# Patient Record
Sex: MALE | Race: WHITE
[De-identification: names, ages, dates, MRNs, and addresses within clinical notes are randomized per-mention and may not be internally consistent; named-entity substitution may affect disease eponyms.]

---

## 2020-10-21 VITALS — SYSTOLIC BLOOD PRESSURE: 155 MMHG | DIASTOLIC BLOOD PRESSURE: 72 MMHG

## 2020-11-11 ENCOUNTER — HOSPITAL ENCOUNTER (OUTPATIENT)
Dept: HOSPITAL 61 - SURGPAT | Age: 70
End: 2020-11-11
Attending: SURGERY
Payer: MEDICARE

## 2020-11-11 DIAGNOSIS — Z20.828: ICD-10-CM

## 2020-11-11 DIAGNOSIS — K80.20: ICD-10-CM

## 2020-11-11 DIAGNOSIS — Z01.818: Primary | ICD-10-CM

## 2020-11-11 LAB
ALBUMIN SERPL-MCNC: 3.6 G/DL (ref 3.4–5)
ALP SERPL-CCNC: 277 U/L (ref 46–116)
ALT SERPL-CCNC: 38 U/L (ref 16–63)
AST SERPL-CCNC: 28 U/L (ref 15–37)
BILIRUB DIRECT SERPL-MCNC: 0.3 MG/DL (ref 0–0.2)
BILIRUB SERPL-MCNC: 0.6 MG/DL (ref 0.2–1)
PROT SERPL-MCNC: 7.3 G/DL (ref 6.4–8.2)

## 2020-11-11 PROCEDURE — 80076 HEPATIC FUNCTION PANEL: CPT

## 2020-11-11 PROCEDURE — U0003 INFECTIOUS AGENT DETECTION BY NUCLEIC ACID (DNA OR RNA); SEVERE ACUTE RESPIRATORY SYNDROME CORONAVIRUS 2 (SARS-COV-2) (CORONAVIRUS DISEASE [COVID-19]), AMPLIFIED PROBE TECHNIQUE, MAKING USE OF HIGH THROUGHPUT TECHNOLOGIES AS DESCRIBED BY CMS-2020-01-R: HCPCS

## 2020-11-18 ENCOUNTER — HOSPITAL ENCOUNTER (OUTPATIENT)
Dept: HOSPITAL 61 - SURG | Age: 70
Discharge: HOME | End: 2020-11-18
Attending: SURGERY
Payer: MEDICARE

## 2020-11-18 VITALS — BODY MASS INDEX: 31.17 KG/M2 | HEIGHT: 71 IN | WEIGHT: 222.67 LBS

## 2020-11-18 VITALS — SYSTOLIC BLOOD PRESSURE: 152 MMHG | DIASTOLIC BLOOD PRESSURE: 66 MMHG

## 2020-11-18 DIAGNOSIS — E78.00: ICD-10-CM

## 2020-11-18 DIAGNOSIS — E11.9: ICD-10-CM

## 2020-11-18 DIAGNOSIS — E66.9: ICD-10-CM

## 2020-11-18 DIAGNOSIS — Z79.899: ICD-10-CM

## 2020-11-18 DIAGNOSIS — Z79.84: ICD-10-CM

## 2020-11-18 DIAGNOSIS — Z79.82: ICD-10-CM

## 2020-11-18 DIAGNOSIS — I10: ICD-10-CM

## 2020-11-18 DIAGNOSIS — K80.10: Primary | ICD-10-CM

## 2020-11-18 DIAGNOSIS — Z98.890: ICD-10-CM

## 2020-11-18 LAB
ANION GAP SERPL CALC-SCNC: 9 MMOL/L (ref 6–14)
BASOPHILS # BLD AUTO: 0 X10^3/UL (ref 0–0.2)
BASOPHILS NFR BLD: 1 % (ref 0–3)
CHLORIDE SERPL-SCNC: 100 MMOL/L (ref 98–107)
CO2 SERPL-SCNC: 26 MMOL/L (ref 21–32)
EOSINOPHIL NFR BLD: 0.2 X10^3/UL (ref 0–0.7)
EOSINOPHIL NFR BLD: 2 % (ref 0–3)
ERYTHROCYTE [DISTWIDTH] IN BLOOD BY AUTOMATED COUNT: 15.1 % (ref 11.5–14.5)
HCT VFR BLD CALC: 37.8 % (ref 39–53)
HGB BLD-MCNC: 12.6 G/DL (ref 13–17.5)
LYMPHOCYTES # BLD: 1.6 X10^3/UL (ref 1–4.8)
LYMPHOCYTES NFR BLD AUTO: 19 % (ref 24–48)
MCH RBC QN AUTO: 31 PG (ref 25–35)
MCHC RBC AUTO-ENTMCNC: 33 G/DL (ref 31–37)
MCV RBC AUTO: 94 FL (ref 79–100)
MONO #: 0.8 X10^3/UL (ref 0–1.1)
MONOCYTES NFR BLD: 9 % (ref 0–9)
NEUT #: 5.8 X10^3/UL (ref 1.8–7.7)
NEUTROPHILS NFR BLD AUTO: 69 % (ref 31–73)
PLATELET # BLD AUTO: 208 X10^3/UL (ref 140–400)
POTASSIUM SERPL-SCNC: 4.2 MMOL/L (ref 3.5–5.1)
RBC # BLD AUTO: 4.02 X10^6/UL (ref 4.3–5.7)
SODIUM SERPL-SCNC: 135 MMOL/L (ref 136–145)
WBC # BLD AUTO: 8.5 X10^3/UL (ref 4–11)

## 2020-11-18 PROCEDURE — 88307 TISSUE EXAM BY PATHOLOGIST: CPT

## 2020-11-18 PROCEDURE — 88304 TISSUE EXAM BY PATHOLOGIST: CPT

## 2020-11-18 PROCEDURE — 85025 COMPLETE CBC W/AUTO DIFF WBC: CPT

## 2020-11-18 PROCEDURE — 82962 GLUCOSE BLOOD TEST: CPT

## 2020-11-18 PROCEDURE — 88312 SPECIAL STAINS GROUP 1: CPT

## 2020-11-18 PROCEDURE — 36415 COLL VENOUS BLD VENIPUNCTURE: CPT

## 2020-11-18 PROCEDURE — 47379 UNLISTED LAPS PX LIVER: CPT

## 2020-11-18 PROCEDURE — 47563 LAPARO CHOLECYSTECTOMY/GRAPH: CPT

## 2020-11-18 PROCEDURE — 74300 X-RAY BILE DUCTS/PANCREAS: CPT

## 2020-11-18 PROCEDURE — 80051 ELECTROLYTE PANEL: CPT

## 2020-11-18 RX ADMIN — INSULIN LISPRO PRN UNIT: 100 INJECTION, SOLUTION INTRAVENOUS; SUBCUTANEOUS at 09:48

## 2020-11-18 RX ADMIN — INSULIN LISPRO PRN UNIT: 100 INJECTION, SOLUTION INTRAVENOUS; SUBCUTANEOUS at 11:51

## 2020-11-18 NOTE — RAD
EXAM: INTRAOPERATIVE CHOLANGIOGRAM.

 

HISTORY: Gallbladder disease. Intraoperative cholangiogram with 

cholecystectomy.

 

COMPARISON: None.

 

FINDINGS: Poor fluoroscopic images are obtained intraoperatively during 

injection of the cystic duct remnant after cholecystectomy. There are no 

filling defects to suggest retained stones. The common duct is not 

dilated. Fluoroscopy time 46 seconds.

 

IMPRESSION:

1. No evidence of retained stones.

 

Electronically signed by: JENNA Meyer MD (11/18/2020 2:11 PM) 

[id number]

## 2020-11-18 NOTE — DISCH
DISCHARGE INSTRUCTIONS


Condition on Discharge


Condition on Discharge:  Stable





Activity After Discharge


Activity Instructions for Disc:  Other, see below (no lifting over 20 lbs X 2 

weeks)





Diet after Discharge


Diet after Discharge:  Regular





Wound Incision Care


Wound/Incision Care:  Other, see below (may remove bandaids tomorrow and shower)





Follow-Up


Follow up with:  Dr Mello in 2 weeks in office, call for appointment 

[phone]











JOSE ELIAS MELLO MD             Nov 18, 2020 14:33

## 2020-11-18 NOTE — PDOC4
Operative Note


Operative Note


Operative Note:





Preoperative Diagnosis: Calculus cholecystitis, abnormal liver testing





Postoperative Diagnosis: Same





Procedure: Laparoscopic cholecystectomy with intraoperative cholangiogram, Peter-

Cut liver biopsy





Surgeons: Nikolay





Assistant:  María DIALLO





Anesthesia: Gen.





Estimated Blood Loss: 100 mL





Specimen: Gallbladder, liver biopsy to pathology





Drains: None





Complications: None





Indications: The patient is a 70 year old male who was previously admitted with 

abnormal liver testing and gallstones.   Surgical treatment was offered by means

of a laparoscopic cholecystectomy.  The risks of surgery were discussed which 

include bleeding, infection, bile duct injury, bile leak, pain, the potential 

for additional surgeries or procedures.  The patient understands and would like 

to proceed.





Description:  The patient was taken to the operating room and laid supine on the

operating table.  General anesthesia was performed.  The abdomen was prepped 

with ChloraPrep and draped in a standard surgical fashion.  A small 

infraumbilical incision was made with a scalpel.  The Veress needle was then 

inserted and a pneumoperitoneum was then created.  A  5 mm trocar was then 

inserted and the laparoscope was introduced.  In the upper midabdomen a 5 mm 

trocar was inserted and in the right upper quadrant one 2.3 mm mini lap grasper 

and one 5 mm trocar were inserted.  With a Peter-cut needle two separate biopsies 

were taken from the right lobe of the liver.  The specimens were sent to 

pathology.  The gallbladder was retracted cephalad.  The cystic duct was 

dissected free from surrounding tissues.  One clip was placed on the duct near 

the gallbladder junction.  An opening was made in the duct and a cholang

iocatheter placed within and secured with a clip.  Using contrast dye and 

fluoroscopy an intraoperative cholangiogram was performed that appeared 

unremarkable.  The clip and catheter were then withdrawn.  Three clips were 

placed on the cystic duct and it was divided.  The cystic artery was then 

identified, dissected free, doubly clipped and divided as well.  The gallbladder

was then mobilized away from the liver with cautery.  The umbilical 5 millimeter

trocar was exchanged for an 11 millimeter trocar.  The gallbladder was then 

placed in an endoscopic bag and extracted at the umbilical trocar site.  The 

fascia there was closed with an 0 Vicryl suture.  All blood and irrigation fluid

was suctioned and hemostasis was good.  The remaining ports were removed and the

pneumoperitoneum was relieved.  The skin incisions were injected with half 

percent Marcaine with epinephrine, and all were closed using 4-0 Monocryl 

suture.  Steri-Strips and dressings were then applied.  The patient tolerated 

the procedure well and was sent to the recovery room in stable condition.  At 

the end of the case all counts were correct.











JOSE ELIAS MELLO MD             Nov 18, 2020 14:30

## 2020-11-23 NOTE — PATHOLOGY
[facility] Accession Number: [id number]

.                                                                01

Material submitted:                                        .

PART A: gallbladder - GALLBLADDER

PART B: liver - LIVER BIOPSY

.                                                                01

Clinical history:                                          .

GALLSTONES, MILD JUANDICE, ELEVATED LIVER TESTING

.                                                                02

**********************************************************************

Diagnosis:

A. Gallbladder, laparoscopic cholecystectomy:

  - Cholelithiasis.

  - Chronic cholecystitis.

.

B. Liver, liver biopsies:

  - Diagnosis pending intradepartmental consult which will be reported as

an addendum.

LBQ  11/23/2020  1511 Local

**********************************************************************

.                                                                02

Comment:

There is no evidence of malignancy.

(JPM/db; 11/23/2020)

.                                                                02

Electronically signed:                                     .

Tae Thomas MD, Pathologist

NPI- [id number]

.                                                                01

Gross description:                                         .

A.  The specimen is received in formalin, labeled "Luis M Yuen,

gallbladder".  Received is an intact gallbladder measuring 10.8 x 3.7 x

3.5 cm in greatest dimensions displaying a blue-gray serosal surface.

Opening the specimen reveals a velvety, bile-stained mucosa with a

gallbladder wall thickness of 0.1 cm.  Calculi are present displaying a

black and spiculated appearance, and no masses or lesions are noted

grossly.  Representative sections, to include the proximal margin, are

submitted in cassette A1.

.

B.  The specimen is received in formalin, labeled "Luis M Yuen, liver

biopsy".  Received are multiple fragments of friable orange-tan tissue

measuring 0.5 x 0.5 x 0.1 cm in aggregate dimensions.  The specimen is

submitted entirely in cassette B1.

(CAA; 11/20/2020)

QAC/QAC  11/20/2020  1920 Local

.                                                                02

Pathologist provided ICD-10:

K80.10

.                                                                02

CPT                                                        .

168472, [id number]

Specimen Comment: A courtesy copy of this report has been sent to [phone], [phone]

Specimen Comment: [id number]

Specimen Comment: Report sent to  / DR OAKLEY

***Performed at:  01

   [facility]

   [address], [place]  [id number]

   MD Jeronimo Wahl MD Phone:  [phone]

***Performed at:  02

   [facility]

   [address], [place]  [id number]

   MD Tae Thomas MD Phone:  [phone]